# Patient Record
Sex: FEMALE | Race: NATIVE HAWAIIAN OR OTHER PACIFIC ISLANDER | HISPANIC OR LATINO | Employment: FULL TIME | URBAN - METROPOLITAN AREA
[De-identification: names, ages, dates, MRNs, and addresses within clinical notes are randomized per-mention and may not be internally consistent; named-entity substitution may affect disease eponyms.]

---

## 2024-09-09 ENCOUNTER — TELEPHONE (OUTPATIENT)
Dept: OBSTETRICS AND GYNECOLOGY | Facility: CLINIC | Age: 29
End: 2024-09-09

## 2024-09-09 NOTE — TELEPHONE ENCOUNTER
Provider: DR. VELASCO    Caller: ELTON WILSON    Relationship to Patient: SELF    Pharmacy: [facility]    Phone Number: [phone]    Reason for Call: NEW OB PT (HAS NOT BEEN SEEN AT THIS PRACTICE) W[facility] DUE TO SHE WAS CONCERNED B/C SHE HAD A PRIOR MISCARRIAGE 6-23. THEY DID U/S WHICH SHOWED HER AT 8WKS (& NOT 12WKS) LMP OF 06-14 - STARTED BLEEDING ON FRI 9-6 IS NOW HAVING LIGHT BLEEDING WITH CRAMPING (CRAMPING STARTED ON SAT 9-7) AND IS STILL CRAMPING (FEELS LIKE PERIOD CRAMPS) AT CURRENT. (ALSO SHE ADV THEY DID U/S AND DID NOT DETECT FETAL HEARTBEAT).    When was the patient last seen: 09-09

## 2024-09-09 NOTE — TELEPHONE ENCOUNTER
Patient has not been seen in this office before.   Returned patient's call.   States she had a SAB in June 2023.   LMP 06/14/24 = 12w 3d  +UPT in early August; was negative when her period was missed in July  Was seen at [facility] 09/04/24 for pregnancy confirmation. States she was told IUP measured 8 weeks with no fetal cardiac activity.   She is now having some menstrual like cramping and light amount of pink bleeding.   States she is uninsured so she wants to pass it at home and be seen afterwards to ensure it has passed completely.   She prefers a female provider.   MBT is O positive  Discussed with ALMA Mendoza. She recommends patient be seen next week; no more than 2 weeks after MAB diagnosed.   Informed patient. Advised to go to ER for excessive bleeding or severe pain. She v/u and agreed. Appointment scheduled.

## 2024-09-14 ENCOUNTER — APPOINTMENT (OUTPATIENT)
Dept: ULTRASOUND IMAGING | Facility: HOSPITAL | Age: 29
End: 2024-09-14
Payer: MEDICAID

## 2024-09-14 ENCOUNTER — HOSPITAL ENCOUNTER (EMERGENCY)
Facility: HOSPITAL | Age: 29
Discharge: HOME | End: 2024-09-14
Attending: EMERGENCY MEDICINE
Payer: MEDICAID

## 2024-09-14 VITALS
HEIGHT: 64 IN | DIASTOLIC BLOOD PRESSURE: 90 MMHG | SYSTOLIC BLOOD PRESSURE: 133 MMHG | RESPIRATION RATE: 16 BRPM | OXYGEN SATURATION: 100 % | TEMPERATURE: 98.2 F | WEIGHT: 250 LBS | BODY MASS INDEX: 42.68 KG/M2 | HEART RATE: 97 BPM

## 2024-09-14 DIAGNOSIS — O03.4 RETAINED PRODUCTS OF CONCEPTION AFTER MISCARRIAGE: Primary | ICD-10-CM

## 2024-09-14 LAB
ALBUMIN SERPL-MCNC: 3.9 G/DL (ref 3.5–5.2)
ALBUMIN/GLOB SERPL: 1.2 G/DL
ALP SERPL-CCNC: 80 U/L (ref 39–117)
ALT SERPL W P-5'-P-CCNC: 14 U/L (ref 1–33)
ANION GAP SERPL CALCULATED.3IONS-SCNC: 12 MMOL/L (ref 5–15)
AST SERPL-CCNC: 19 U/L (ref 1–32)
BASOPHILS # BLD AUTO: 0.06 10*3/MM3 (ref 0–0.2)
BASOPHILS NFR BLD AUTO: 0.7 % (ref 0–1.5)
BILIRUB SERPL-MCNC: 0.2 MG/DL (ref 0–1.2)
BUN SERPL-MCNC: 10 MG/DL (ref 6–20)
BUN/CREAT SERPL: 16.7 (ref 7–25)
CALCIUM SPEC-SCNC: 9.1 MG/DL (ref 8.6–10.5)
CHLORIDE SERPL-SCNC: 102 MMOL/L (ref 98–107)
CO2 SERPL-SCNC: 23 MMOL/L (ref 22–29)
CREAT SERPL-MCNC: 0.6 MG/DL (ref 0.57–1)
DEPRECATED RDW RBC AUTO: 40.3 FL (ref 37–54)
EGFRCR SERPLBLD CKD-EPI 2021: 123.2 ML/MIN/1.73
EOSINOPHIL # BLD AUTO: 0.1 10*3/MM3 (ref 0–0.4)
EOSINOPHIL NFR BLD AUTO: 1.2 % (ref 0.3–6.2)
ERYTHROCYTE [DISTWIDTH] IN BLOOD BY AUTOMATED COUNT: 14.5 % (ref 12.3–15.4)
GLOBULIN UR ELPH-MCNC: 3.2 GM/DL
GLUCOSE SERPL-MCNC: 276 MG/DL (ref 65–99)
HCT VFR BLD AUTO: 41.5 % (ref 34–46.6)
HGB BLD-MCNC: 12.8 G/DL (ref 12–15.9)
IMM GRANULOCYTES # BLD AUTO: 0.06 10*3/MM3 (ref 0–0.05)
IMM GRANULOCYTES NFR BLD AUTO: 0.7 % (ref 0–0.5)
LYMPHOCYTES # BLD AUTO: 2.32 10*3/MM3 (ref 0.7–3.1)
LYMPHOCYTES NFR BLD AUTO: 28.1 % (ref 19.6–45.3)
MCH RBC QN AUTO: 23.8 PG (ref 26.6–33)
MCHC RBC AUTO-ENTMCNC: 30.8 G/DL (ref 31.5–35.7)
MCV RBC AUTO: 77.3 FL (ref 79–97)
MONOCYTES # BLD AUTO: 0.45 10*3/MM3 (ref 0.1–0.9)
MONOCYTES NFR BLD AUTO: 5.4 % (ref 5–12)
NEUTROPHILS NFR BLD AUTO: 5.28 10*3/MM3 (ref 1.7–7)
NEUTROPHILS NFR BLD AUTO: 63.9 % (ref 42.7–76)
NRBC BLD AUTO-RTO: 0 /100 WBC (ref 0–0.2)
PLATELET # BLD AUTO: 334 10*3/MM3 (ref 140–450)
PMV BLD AUTO: 10.3 FL (ref 6–12)
POTASSIUM SERPL-SCNC: 4.3 MMOL/L (ref 3.5–5.2)
PROT SERPL-MCNC: 7.1 G/DL (ref 6–8.5)
RBC # BLD AUTO: 5.37 10*6/MM3 (ref 3.77–5.28)
SODIUM SERPL-SCNC: 137 MMOL/L (ref 136–145)
WBC NRBC COR # BLD AUTO: 8.27 10*3/MM3 (ref 3.4–10.8)

## 2024-09-14 PROCEDURE — 36415 COLL VENOUS BLD VENIPUNCTURE: CPT

## 2024-09-14 PROCEDURE — 85025 COMPLETE CBC W/AUTO DIFF WBC: CPT | Performed by: EMERGENCY MEDICINE

## 2024-09-14 PROCEDURE — 80053 COMPREHEN METABOLIC PANEL: CPT | Performed by: EMERGENCY MEDICINE

## 2024-09-14 PROCEDURE — 76817 TRANSVAGINAL US OBSTETRIC: CPT

## 2024-09-14 PROCEDURE — 99284 EMERGENCY DEPT VISIT MOD MDM: CPT

## 2024-09-14 NOTE — DISCHARGE INSTRUCTIONS
Keep outpatient follow-up with OB.    Observe symptoms and return to the ER with any further concern.

## 2024-09-14 NOTE — ED PROVIDER NOTES
Subjective   History of Present Illness  29-year-old female who presents for evaluation of possible vaginal prolapse versus retained products of conception.  Patient reports that she presented to [facility] yesterday and ultimately had a miscarriage.  She states that an ultrasound performed afterwards and confirmed there was no retained products conception.  She felt like something was protruding from her vagina prior to being discharged but she was reevaluated and they did not notice anything abnormal per her report.  She went home, felt like something was still protruding, and was able to take a picture that showed something to be protruding, she represented [facility] had repeat evaluation and once again she was told nothing abnormal was present.  She has continued to feel like there has been bulging from her vaginal region through the night and into this morning which prompted current presentation to the ER.  She denies fever or infectious symptoms.  No chest pain.  No abdominal pain.  She reports appropriate continued bleeding after the miscarriage.  She cannot remember the name of her OB.  No other acute complaints.      Review of Systems   Constitutional:  Negative for chills, fatigue and fever.   HENT:  Negative for congestion, ear pain, postnasal drip, sinus pressure and sore throat.    Eyes:  Negative for pain, redness and visual disturbance.   Respiratory:  Negative for cough, chest tightness and shortness of breath.    Cardiovascular:  Negative for chest pain, palpitations and leg swelling.   Gastrointestinal:  Negative for abdominal pain, anal bleeding, blood in stool, diarrhea, nausea and vomiting.   Endocrine: Negative for polydipsia and polyuria.   Genitourinary:  Positive for vaginal bleeding. Negative for difficulty urinating, dysuria, frequency and urgency.   Musculoskeletal:  Negative for arthralgias, back pain and neck pain.   Skin:  Negative for pallor and rash.    Allergic/Immunologic: Negative for environmental allergies and immunocompromised state.   Neurological:  Negative for dizziness, weakness and headaches.   Hematological:  Negative for adenopathy.   Psychiatric/Behavioral:  Negative for confusion, self-injury and suicidal ideas. The patient is not nervous/anxious.    All other systems reviewed and are negative.      No past medical history on file.    No Known Allergies    No past surgical history on file.    No family history on file.    Social History     Socioeconomic History    Marital status:    Tobacco Use    Smoking status: Never    Smokeless tobacco: Never   Vaping Use    Vaping status: Never Used   Substance and Sexual Activity    Alcohol use: Never    Drug use: Never           Objective   Physical Exam  Vitals and nursing note reviewed.   Constitutional:       General: She is not in acute distress.     Appearance: Normal appearance. She is well-developed. She is not toxic-appearing or diaphoretic.   HENT:      Head: Normocephalic and atraumatic.      Right Ear: External ear normal.      Left Ear: External ear normal.      Nose: Nose normal.   Eyes:      General: Lids are normal.      Pupils: Pupils are equal, round, and reactive to light.   Neck:      Trachea: No tracheal deviation.   Cardiovascular:      Rate and Rhythm: Normal rate and regular rhythm. Tachycardia present.      Pulses: No decreased pulses.      Heart sounds: Normal heart sounds. No murmur heard.     No friction rub. No gallop.   Pulmonary:      Effort: Pulmonary effort is normal. No respiratory distress.      Breath sounds: Normal breath sounds. No decreased breath sounds, wheezing, rhonchi or rales.   Abdominal:      General: Bowel sounds are normal.      Palpations: Abdomen is soft.      Tenderness: There is no abdominal tenderness. There is no guarding or rebound.   Musculoskeletal:         General: No deformity. Normal range of motion.      Cervical back: Normal range of  motion and neck supple.   Lymphadenopathy:      Cervical: No cervical adenopathy.   Skin:     General: Skin is warm and dry.      Findings: No rash.   Neurological:      Mental Status: She is alert and oriented to person, place, and time.      Cranial Nerves: No cranial nerve deficit.      Sensory: No sensory deficit.   Psychiatric:         Speech: Speech normal.         Behavior: Behavior normal.         Thought Content: Thought content normal.         Judgment: Judgment normal.         Procedures           ED Course                                             Medical Decision Making  Differential diagnosis includes retained products of conception, anemia, infection, other unspecified etiology.    On pelvic exam tissue was removed from the vaginal vault consistent with retained products.  This was mildly lodged in the cervical os.  After removal the cervical os was noted to be closed with mild oozing of blood and no other signs of tissue was identified.    Labs are stable and nonconcerning normal kidney function, normal H&H, normal white count.    Repeat transvaginal ultrasound shows no signs of retained proximal of conception.    The patient will be discharged with advised outpatient follow-up with obstetrician.    Advised return to ER with any further concern.    Problems Addressed:  Retained products of conception after miscarriage: complicated acute illness or injury with systemic symptoms    Amount and/or Complexity of Data Reviewed  Independent Historian: spouse  External Data Reviewed: labs and radiology.  Labs: ordered. Decision-making details documented in ED Course.  Radiology: ordered and independent interpretation performed. Decision-making details documented in ED Course.        Final diagnoses:   Retained products of conception after miscarriage       ED Disposition  ED Disposition       ED Disposition   Discharge    Condition   Stable    Comment   --               No follow-up provider specified.        Medication List      No changes were made to your prescriptions during this visit.            Suzanne Bowie MD  09/16/24 7299

## 2024-09-14 NOTE — Clinical Note
[facility] EMERGENCY DEPARTMENT  [address]  [facility] [address]  Phone: [phone]    Lisnadra COPPOLA was seen and treated in our emergency department on 9/14/2024.  She may return to work on 09/16/2024.         Thank you for choosing [facility].    Suzanne Bowie MD

## 2024-09-17 DIAGNOSIS — O03.9 MISCARRIAGE: Primary | ICD-10-CM

## 2024-09-18 ENCOUNTER — OFFICE VISIT (OUTPATIENT)
Dept: OBSTETRICS AND GYNECOLOGY | Facility: CLINIC | Age: 29
End: 2024-09-18
Payer: MEDICAID

## 2024-09-18 VITALS
DIASTOLIC BLOOD PRESSURE: 88 MMHG | BODY MASS INDEX: 44.69 KG/M2 | WEIGHT: 261.8 LBS | SYSTOLIC BLOOD PRESSURE: 116 MMHG | HEIGHT: 64 IN

## 2024-09-18 DIAGNOSIS — O03.9 MISCARRIAGE: Primary | ICD-10-CM

## 2024-09-18 PROBLEM — F41.9 ANXIETY AND DEPRESSION: Status: ACTIVE | Noted: 2022-11-02

## 2024-09-18 PROBLEM — F32.A ANXIETY AND DEPRESSION: Status: ACTIVE | Noted: 2022-11-02

## 2024-09-18 LAB — HCG INTACT+B SERPL-ACNC: 28 MIU/ML

## 2025-04-14 ENCOUNTER — OFFICE VISIT (OUTPATIENT)
Dept: ORTHOPEDIC SURGERY | Facility: CLINIC | Age: 30
End: 2025-04-14
Payer: COMMERCIAL

## 2025-04-14 VITALS
SYSTOLIC BLOOD PRESSURE: 188 MMHG | BODY MASS INDEX: 42.68 KG/M2 | WEIGHT: 250 LBS | DIASTOLIC BLOOD PRESSURE: 100 MMHG | HEIGHT: 64 IN

## 2025-04-14 DIAGNOSIS — S99.912A INJURY OF LEFT ANKLE, INITIAL ENCOUNTER: Primary | ICD-10-CM

## 2025-04-14 DIAGNOSIS — S82.832A CLOSED FRACTURE OF DISTAL END OF LEFT FIBULA, UNSPECIFIED FRACTURE MORPHOLOGY, INITIAL ENCOUNTER: ICD-10-CM

## 2025-04-14 PROCEDURE — 99204 OFFICE O/P NEW MOD 45 MIN: CPT | Performed by: ORTHOPAEDIC SURGERY

## 2025-04-14 NOTE — PATIENT INSTRUCTIONS
"Even Up          Available on [employer], type in \"even up for walking boot\"    Ankle Brace  Go to amazon.com and type in \"Med Spec ASO Ankle Stabilizer\"         -Stabilizing Straps form complete figure-eight to protect and support ankle   -Ballistic nylon boot provides superior durability and strength   -Elastic cuff closure enhances support and keeps laces and stabilizing straps secure   -Bilateral design so each size will fit left or right foot  -Low profile to fit in any type of shoe     "

## 2025-04-14 NOTE — PROGRESS NOTES
[facility] [place] [facility] Office Visit     Office Visit       Date: 04/14/2025   Patient Name: Lisandra COPPOLA  MRN: [id number]  YOB: 1995    Referring Physician: Clarita Lal APRN     Chief Complaint:   Chief Complaint   Patient presents with    Left Ankle - Pain     DOI: 4/7/25       History of Present Illness: Lisandra COPPOLA is a 29 y.o. female who is here today with chief complaint of left ankle pain.   suffered inversion injury on April 7.   has had pain and swelling since that time but has improved.  Had difficulty walking after injury and today is now walking in a tall boot.  Discontinue crutches.  Works as a director of a .  Denies smoking.   did have similar injury about 5 months ago to the same ankle.  Reports pain about the ankle today seems a little bit worse than prior.   previously wore a boot for a couple of weeks before returning to normal activities.  No previous therapy.  Does not report symptoms of chronic instability.    Subjective   Review of Systems: Review of Systems   Constitutional: Negative.  Negative for chills, fatigue and fever.   HENT: Negative.  Negative for congestion and dental problem.    Eyes: Negative.  Negative for blurred vision.   Respiratory: Negative.  Negative for shortness of breath.    Cardiovascular: Negative.  Negative for leg swelling.   Gastrointestinal: Negative.  Negative for abdominal pain.   Endocrine: Negative.  Negative for polyuria.   Genitourinary: Negative.  Negative for difficulty urinating.   Musculoskeletal:  Positive for arthralgias.   Skin: Negative.    Allergic/Immunologic: Negative.    Neurological: Negative.    Hematological: Negative.  Negative for adenopathy.   Psychiatric/Behavioral: Negative.  Negative for behavioral problems.         Past Medical History:   Past Medical History:   Diagnosis Date    Anxiety     Chronic hypertension     Depression     Diabetes  "mellitus, type II     Hyperlipidemia     Hypertension        Past Surgical History: History reviewed. No pertinent surgical history.    Family History: History reviewed. No pertinent family history.    Social History:   Social History     Socioeconomic History    Marital status:    Tobacco Use    Smoking status: Never    Smokeless tobacco: Never   Vaping Use    Vaping status: Never Used   Substance and Sexual Activity    Alcohol use: Never    Drug use: Never    Sexual activity: Yes       Medications:   Current Outpatient Medications:     buPROPion (WELLBUTRIN) 75 MG tablet, Take 1 tablet by mouth., Disp: , Rfl:     escitalopram (LEXAPRO) 10 MG tablet, Take 1 tablet by mouth Daily., Disp: , Rfl:     ibuprofen (ADVIL,MOTRIN) 600 MG tablet, Take 1 tablet by mouth 3 (Three) Times a Day for 10 days., Disp: 30 tablet, Rfl: 0    metFORMIN (GLUCOPHAGE) 500 MG tablet, Take 1 tablet by mouth., Disp: , Rfl:     norethindrone-ethinyl estradiol (MICROGESTIN 1/20) 1-20 MG-MCG per tablet, Take 1 tablet by mouth Daily., Disp: , Rfl:     valsartan (DIOVAN) 160 MG tablet, Take 1 tablet by mouth Daily., Disp: , Rfl:     Allergies: No Known Allergies    I reviewed the patient's chief complaint, history of present illness, review of systems, past medical history, surgical history, family history, social history, medications and allergy list.     Objective    Vital Signs:   Vitals:    04/14/25 0759   BP: (!) 188/100   Weight: 113 kg (250 lb)   Height: 162.6 cm (64.02\")     Body mass index is 42.89 kg/m².    Ortho Exam:  left LE Foot and Ankle Exam:   Boot removed for exam.  Ambulates in clinic with slightly antalgic gait pattern.   Neurological exam of the superficial peroneal, deep peroneal, plantar, sural and saphenous nerves demonstrates intact sensation and normal motor function.   There is good perfusion to the toes.   The skin is intact throughout the foot and ankle without ulceration.   Range of motion of ankle, subtalar " joint, midfoot and toes is within normal limits.   There is tenderness to palpation over the lateral ankle ligaments, mild tenderness to palpation medially over deltoid ligament, no tenderness to palpation over the proximal fibula, visible swelling about the ankle with ecchymosis lateral hindfoot, no increase in pain with syndesmotic stress.  There is some pain with direct palpation over the AITFL.    Results Review:   XR ANKLE 3+ VW LEFT     Date of Exam: 4/7/2025 3:03 PM EDT     Indication: fall today and twist left ankle. Edema and pain to lateral malleolus. Unable to weight bear. Need to r/o fx.     Comparison: None available.     Findings:  There is marked soft tissue swelling over the lateral malleolus. There is a small avulsion fracture from the distal tip of the malleolus. The bony structures appear otherwise intact and in normal alignment. The ankle mortise is unremarkable.     IMPRESSION:  Impression:  Small avulsion fracture from the distal tip of the malleolus.           Electronically Signed: Justin Rodriguez MD    4/7/2025 3:27 PM EDT    Workstation ID: [id number]    04/14/25 I have personally reviewed and interpreted the images from outside facility with the documented findings, fracture tip of the distal fibula      Assessment / Plan    Assessment/Plan:   Diagnoses and all orders for this visit:    1. Injury of left ankle, initial encounter (Primary)    2. Closed fracture of distal end of left fibula, unspecified fracture morphology, initial encounter      Patient with ankle pain/distal fibular fracture following inversion injury (an injury with risk of long term functional impairment). Decision regarding surgical intervention considered. Patient is not a candidate due to trial of nonoperative management. Also reviewed external note and imaging studies from April 7. Patient can continue weightbearing as tolerated in CAM walking boot and encouraged to wean into a normal supportive shoe with ankle brace in  the next 1 to 2 weeks.  Ankle brace provided in clinic today.  Patient provided with prescription for physical therapy for functional ankle rehab for low/high ankle sprain. Goal is for patient to proceed through phases of ankle rehab over the next 6 to 8 weeks. Weaning out of brace during that time.     Discussed with patient that a small percentage of patients with this injury go on to develop chronic instability requiring surgery. The likely recommended surgical procedure would be a ligament reconstruction.  The risks of such a procedure would include but are not limited to infection, neurovascular damage, hardware failure, stiffness, etc.       Follow Up:   Return if symptoms worsen or fail to improve.      Sheng Balderas MD  [facility] [place] [facility] Sur[facility]